# Patient Record
Sex: FEMALE | Race: WHITE | NOT HISPANIC OR LATINO | URBAN - METROPOLITAN AREA
[De-identification: names, ages, dates, MRNs, and addresses within clinical notes are randomized per-mention and may not be internally consistent; named-entity substitution may affect disease eponyms.]

---

## 2022-09-15 ENCOUNTER — OFFICE (OUTPATIENT)
Dept: URBAN - METROPOLITAN AREA CLINIC 34 | Facility: CLINIC | Age: 47
End: 2022-09-15

## 2022-09-15 ENCOUNTER — OFFICE (OUTPATIENT)
Dept: URBAN - METROPOLITAN AREA CLINIC 34 | Facility: CLINIC | Age: 47
End: 2022-09-15
Payer: COMMERCIAL

## 2022-09-15 VITALS
WEIGHT: 227 LBS | HEIGHT: 64 IN | TEMPERATURE: 97.9 F | HEART RATE: 97 BPM | SYSTOLIC BLOOD PRESSURE: 129 MMHG | DIASTOLIC BLOOD PRESSURE: 87 MMHG

## 2022-09-15 DIAGNOSIS — K62.5 HEMORRHAGE OF ANUS AND RECTUM: ICD-10-CM

## 2022-09-15 DIAGNOSIS — R19.8 OTHER SPECIFIED SYMPTOMS AND SIGNS INVOLVING THE DIGESTIVE S: ICD-10-CM

## 2022-09-15 DIAGNOSIS — R93.3 ABNORMAL FINDINGS ON DIAGNOSTIC IMAGING OF OTHER PARTS OF DI: ICD-10-CM

## 2022-09-15 PROCEDURE — 99204 OFFICE O/P NEW MOD 45 MIN: CPT

## 2022-09-15 PROCEDURE — 00031: CPT | Performed by: INTERNAL MEDICINE

## 2022-09-15 NOTE — SERVICEHPINOTES
NATALIE HEARD   is a   47   year old    female who is being seen in consultation at the request of   RICARDO TROTTER   for rectal bleeding and abnormal imaging. Patient reports brbpr onset 12/2021. Episodes at least once a month, lasting anywhere from 1 day to 5 days. Bleeding "can be as heavy as a cycle". No rectal pain, tearing sensation or hx of hemorrhoids. She notes her bowel habits have been irregular since [facility], has stools across BSS, but rarely skips more than 1 day. Admits to poor fiber intake but drinks plenty of water. She has a hx of medullary sponge kidney and has to undergo procedures about every 6 mo. +neurogenic bladder, has to cath, and therefore frequent UTIs. Had imaging for this reason and was advised to have a colonoscopy to further investigate abnormal findings.br--4/6/2022 CT abd/pelv w/o PO or IV contrast: stool and gas in colon. Small polypoid structure in sigmoid measuring 15 mm patient pulls up report on her phone, will request copy from hospital.
brShe has never had a colonoscopy. No abd pain, fevers, chills, night sweats or unintentional weight loss.

## 2022-10-10 ENCOUNTER — OFFICE (OUTPATIENT)
Dept: URBAN - METROPOLITAN AREA CLINIC 98 | Facility: CLINIC | Age: 47
End: 2022-10-10
Payer: COMMERCIAL

## 2022-10-10 ENCOUNTER — OFFICE (OUTPATIENT)
Dept: URBAN - METROPOLITAN AREA PATHOLOGY 18 | Facility: PATHOLOGY | Age: 47
End: 2022-10-10
Payer: COMMERCIAL

## 2022-10-10 VITALS
RESPIRATION RATE: 14 BRPM | HEART RATE: 83 BPM | OXYGEN SATURATION: 99 % | SYSTOLIC BLOOD PRESSURE: 114 MMHG | DIASTOLIC BLOOD PRESSURE: 76 MMHG | DIASTOLIC BLOOD PRESSURE: 68 MMHG | HEART RATE: 91 BPM | HEART RATE: 78 BPM | DIASTOLIC BLOOD PRESSURE: 56 MMHG | RESPIRATION RATE: 16 BRPM | HEIGHT: 64 IN | DIASTOLIC BLOOD PRESSURE: 65 MMHG | SYSTOLIC BLOOD PRESSURE: 100 MMHG | DIASTOLIC BLOOD PRESSURE: 47 MMHG | HEART RATE: 85 BPM | OXYGEN SATURATION: 98 % | SYSTOLIC BLOOD PRESSURE: 110 MMHG | DIASTOLIC BLOOD PRESSURE: 52 MMHG | DIASTOLIC BLOOD PRESSURE: 63 MMHG | DIASTOLIC BLOOD PRESSURE: 54 MMHG | WEIGHT: 227 LBS | TEMPERATURE: 97.3 F | RESPIRATION RATE: 13 BRPM | SYSTOLIC BLOOD PRESSURE: 108 MMHG | SYSTOLIC BLOOD PRESSURE: 145 MMHG | TEMPERATURE: 98.6 F | HEART RATE: 81 BPM | SYSTOLIC BLOOD PRESSURE: 103 MMHG | RESPIRATION RATE: 18 BRPM | HEART RATE: 75 BPM | OXYGEN SATURATION: 95 % | RESPIRATION RATE: 12 BRPM

## 2022-10-10 DIAGNOSIS — D17.79 BENIGN LIPOMATOUS NEOPLASM OF OTHER SITES: ICD-10-CM

## 2022-10-10 DIAGNOSIS — R93.3 ABNORMAL FINDINGS ON DIAGNOSTIC IMAGING OF OTHER PARTS OF DI: ICD-10-CM

## 2022-10-10 DIAGNOSIS — D12.5 BENIGN NEOPLASM OF SIGMOID COLON: ICD-10-CM

## 2022-10-10 DIAGNOSIS — K62.5 HEMORRHAGE OF ANUS AND RECTUM: ICD-10-CM

## 2022-10-10 PROBLEM — K63.5 POLYP OF COLON: Status: ACTIVE | Noted: 2022-10-10

## 2022-10-10 PROCEDURE — 88305 TISSUE EXAM BY PATHOLOGIST: CPT | Performed by: PATHOLOGY

## 2023-12-12 ENCOUNTER — OFFICE (OUTPATIENT)
Dept: URBAN - METROPOLITAN AREA CLINIC 34 | Facility: CLINIC | Age: 48
End: 2023-12-12
Payer: COMMERCIAL

## 2023-12-12 VITALS
HEIGHT: 64 IN | DIASTOLIC BLOOD PRESSURE: 76 MMHG | WEIGHT: 234 LBS | SYSTOLIC BLOOD PRESSURE: 114 MMHG | TEMPERATURE: 98.2 F | HEART RATE: 86 BPM

## 2023-12-12 DIAGNOSIS — Z86.010 PERSONAL HISTORY OF COLONIC POLYPS: ICD-10-CM

## 2023-12-12 PROCEDURE — S0285 CNSLT BEFORE SCREEN COLONOSC: HCPCS | Performed by: NURSE PRACTITIONER

## 2023-12-22 ENCOUNTER — AMBULATORY SURGICAL CENTER (OUTPATIENT)
Dept: URBAN - METROPOLITAN AREA SURGERY 23 | Facility: SURGERY | Age: 48
End: 2023-12-22
Payer: COMMERCIAL

## 2023-12-22 DIAGNOSIS — D12.8 BENIGN NEOPLASM OF RECTUM: ICD-10-CM

## 2023-12-22 DIAGNOSIS — Z09 ENCOUNTER FOR FOLLOW-UP EXAMINATION AFTER COMPLETED TREATMEN: ICD-10-CM

## 2023-12-22 DIAGNOSIS — K63.5 POLYP OF COLON: ICD-10-CM

## 2023-12-22 DIAGNOSIS — K64.0 FIRST DEGREE HEMORRHOIDS: ICD-10-CM

## 2023-12-22 DIAGNOSIS — Z86.010 PERSONAL HISTORY OF COLONIC POLYPS: ICD-10-CM

## 2023-12-22 PROCEDURE — 45380 COLONOSCOPY AND BIOPSY: CPT | Mod: 59,PT | Performed by: INTERNAL MEDICINE

## 2023-12-22 PROCEDURE — 45385 COLONOSCOPY W/LESION REMOVAL: CPT | Mod: PT | Performed by: INTERNAL MEDICINE
